# Patient Record
(demographics unavailable — no encounter records)

---

## 2024-11-12 NOTE — CONSULT LETTER
[Dear  ___] : Dear  [unfilled], [Courtesy Letter:] : I had the pleasure of seeing your patient, [unfilled], in my office today. [Please see my note below.] : Please see my note below. [Consult Closing:] : Thank you very much for allowing me to participate in the care of this patient.  If you have any questions, please do not hesitate to contact me. [Sincerely,] : Sincerely, [FreeTextEntry3] : Elizabeth Nugent MD  [employer] at [facility] Pulmonary, Critical Care, and Sleep Medicine

## 2024-11-12 NOTE — HISTORY OF PRESENT ILLNESS
[Never] : never [TextBox_4] : 74 yo F w/ hx of goiter, GERD, referred by Dr. Bales for insomnia as well as pulmonary consultation.  Sleep: She endorses insomnia since childhood. Mostly trouble falling asleep. In teens, typically went to bed late (1AM). 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 Currently goes to bed around 4AM, often takes her ~ 2 hours to fall asleep and sometimes more. Wakes up usually around noon. Estimates TST is 4-5 hrs nightly. No snoring, witnessed apneas, AM headaches, family hx of ANT. Occasional restless legs. Her ideal sleep schedule would be between 1-2AM-10AM.  Pulm: Had URI in May, since that time developed sporadic feeling of "puff of air" in her chest accompanied by cough. Does have GERD and takes prn Prilosec. No dyspnea, no CONDE, no wheezing. Father had lung CA, was a smoker. No prior hx of lung disease or known environmental exposures.  9/23/24: Feels like her symptoms have progressed, now she describes feeling like "when a plane hits an air pocket", difficult to describe the symptoms, happens 4 times/day, associated with cough, dry. She also has early am cough with yellow phlegm, half a teaspoon in volume. No chest pain. No limitations on physical activity, can walk multiple blocks, multiple flights of stairs. Takes naps occasionally. Feels sleepy by 7pm or so. Sleeps for 4-5 hours nightly. Says that for sleep study she was laying down for the whole night, and did not sleep at all.    11/11/2024 continues to have a "puff of air" in her mid chest; ppi x 18 days failed; no sob or exertional issues [ESS] : 1

## 2024-11-12 NOTE — ASSESSMENT
[FreeTextEntry1] : REVIEWED HST 8/2024: AHI 5.8 to 20.5 CT chest from 8/2024 reviewed: LLL atelectasis? RML atelectasis?; no other sig pathology  72 yo F w/ hx of goiter, GERD, referred by Dr. Bales for insomnia as well as persistent "puff of air" sensation.  Sleep: She endorses insomnia since childhood. Mostly trouble falling asleep. She has a delayed circadian rhythm; goes to bed around 4AM, sleep latency ~2 hrs, sleeps until noon. In teens, typically went to bed late (1AM). Acting late at night and caretaking for her mother reinforced delayed sleep pattern.HST showed mild-moderate ANT, with adequate sleep time 6.5h. Patient feels like she did not sleep at all, however HST shows phasic sleep and good quality. Possibly has paradoxical insomnia.  Plan:  -- The risks of untreated moderate obstructive sleep apnea were reviewed, including cardiovascular, neurological, metabolic disease among others. Options for treating sleep apnea discussed, including PAP therapy, CN12 stimulation and MAD. Also discussed treatment benefit for mild ANT is limited to symptomatic improvement, which she does have.  -- plan to obtain overnight PSG with delayed phase (4am till noon Fri-Sat) to definitively define that she sleeps ; will do this when she is ready  Pulm: Had URI in May, since that time developed sporadic feeling of "puff of air" in her chest accompanied by cough. Cough now resolved. No dyspnea, no CONDE, no wheezing. Father had lung CA, was a smoker. No prior hx of lung disease or known environmental exposures. Based on hx, do not suspect symptoms related to significant lung pathology. Reviewed CT chest, mild basilar atelectasis. Discussed that this is unrelated to heart or lung disease (had cardiac workup). Failed PPI trial. Dysfunctional breathing syndrome? Plan:  -- provided information regarding breathing exercises  RTC in 3 months, and/or when ready for PSG

## 2024-11-12 NOTE — PHYSICAL EXAM
[No Acute Distress] : no acute distress [Normal Appearance] : normal appearance [No Resp Distress] : no resp distress [No Acc Muscle Use] : no acc muscle use [Clear to Auscultation Bilaterally] : clear to auscultation bilaterally [Normal Gait] : normal gait [Oriented x3] : oriented x3 [Normal Affect] : normal affect

## 2024-12-04 NOTE — HISTORY OF PRESENT ILLNESS
[FreeTextEntry1] : 75 y/o female with who goiter, gerd, alin presents for f/up today  last seen  -24 hour bp monitor : avg 130/76 no cp, sob, orthopnea, pnd, edema, palpitations   seen by Dr. Nugent  sleep study - mild to mod alin  exercises w stretching walks 4 miles in a week diet healthy no mental health issues stress moderate sleep few hours due insomnia  PMH/PSH: diverticulosis goiter lasik gerd mild to mod alin  ALL: nkda  MEDS: none  SH: no tobacco social etoh no drugs retired actor/social work from [place]  no children  FH: mother - colon cancer,  105 father - lung cancer,  liver cancer 84 brother - alive, healthy 75 sister - alive, ppm, 76

## 2024-12-04 NOTE — DISCUSSION/SUMMARY
[Patient] : the patient [___ Month(s)] : in [unfilled] month(s) [FreeTextEntry1] : 75 y/o female with who goiter, gerd, alin presents for f/up today  -holter ordered for palpitations, pvc -24 hour bp monitor 5/24: avg 130/76 -CTA cor 3/24: Cardiac: 1. The calcium score is 0 Agatston units. 2. Distal LAD is suboptimally visualized, but appears non obstructive. 3. D1, D2 and OM1 are probably normal. 4. Remaining segments appear angiographically normal Non-cardiac: 1. No acute pathology. -Echo 3/24: 1. Left ventricular cavity is normal in size. Left ventricular wall thickness is normal. Left ventricular systolic function is normal with an ejection fraction of 73 % by Real's method of disks. There are no regional wall motion abnormalities seen. 2. Normal left ventricular diastolic function. 3. Normal right ventricular cavity size, with wall thickness, and normal systolic function. 4. No significant valvular disease. 5. No pericardial effusion seen. -ekg ordered today - nsr w pvc, normal intervals, no st/t changes -labs 2024 reviewed, labs ordered today -f/up w Dr. Nugent - mild to mod ALIN -counseled on cvd risk factors -f/up 2-3 months for cvd risk factors, bp  I have spent 30 minutes reviewing labs, records, tests and discussed cvd risk factors.    [EKG obtained to assist in diagnosis and management of assessed problem(s)] : EKG obtained to assist in diagnosis and management of assessed problem(s)

## 2025-02-06 NOTE — REVIEW OF SYSTEMS
[Fatigue] : no fatigue [Decreased Appetite] : appetite not decreased [Recent Weight Gain (___ Lbs)] : no recent weight gain [Recent Weight Loss (___ Lbs)] : no recent weight loss [Visual Field Defect] : no visual field defect [Dry Eyes] : no dryness [Dysphagia] : no dysphagia [Dysphonia] : no dysphonia [Chest Pain] : no chest pain [Palpitations] : no palpitations [Shortness Of Breath] : no shortness of breath [Nausea] : no nausea [Vomiting] : no vomiting [Polyuria] : no polyuria [Irregular Menses] : regular menses [Acanthosis] : no acanthosis  [Headaches] : no headaches [Tremors] : no tremors [Depression] : no depression [Polydipsia] : no polydipsia [Cold Intolerance] : no cold intolerance [Easy Bleeding] : no ~M tendency for easy bleeding [Easy Bruising] : no tendency for easy bruising

## 2025-02-06 NOTE — PHYSICAL EXAM
[Alert] : alert [Well Nourished] : well nourished [No Acute Distress] : no acute distress [Normal Sclera/Conjunctiva] : normal sclera/conjunctiva [PERRL] : pupils equal, round and reactive to light [Normal Outer Ear/Nose] : the ears and nose were normal in appearance [Normal TMs] : both tympanic membranes were normal [No Neck Mass] : no neck mass was observed [Thyroid Not Enlarged] : the thyroid was not enlarged [No Respiratory Distress] : no respiratory distress [Clear to Auscultation] : lungs were clear to auscultation bilaterally [Normal S1, S2] : normal S1 and S2 [Normal Rate] : heart rate was normal [Regular Rhythm] : with a regular rhythm [Normal Bowel Sounds] : normal bowel sounds [Soft] : abdomen soft [Normal Gait] : normal gait [No Joint Swelling] : no joint swelling seen [No Rash] : no rash [No Skin Lesions] : no skin lesions [Oriented x3] : oriented to person, place, and time [Normal Affect] : the affect was normal [Normal Insight/Judgement] : insight and judgment were intact

## 2025-02-06 NOTE — HISTORY OF PRESENT ILLNESS
[FreeTextEntry1] : 73 year old female with history of multiple nodules.   Last US was in 2022. As per patient there were multiple thyroid nodules, and largest is 1.5 cm. No previous FNAs were performed  No dysphagia, no hoarseness She did report anterior neck pressure   No FH of thyroid cancer, mother with history of hypothyroidism No exposure to head or neck radiation in the past  TFTs were wnl      	 ACC: [id number]     EXAM:  US HEAD NECK SOFT TISSUE   ORDERED BY: GOGO ROMO  PROCEDURE DATE:  08/30/2024    INTERPRETATION:  THYROID ULTRASOUND with Doppler dated 8/30/2024 3:51 PM  History: History of thyroid nodules;  Technique: Ultrasound evaluation of the thyroid was performed in the axial and sagittal projections. Color Doppler evaluation was also performed.  Prior study: CT chest dated 3/11/2024.  Findings:  RIGHT LOBE: Dimensions: 6.1 x 2.4 x 2.2 cm (sagittal x AP x transverse) Echotexture: Homogenous p.m. Vascularity: Normal. The right lobe contains 3 nodules.  Nodule 1: Location: Superior pole. Dimensions: 0.4 x 0.5 x 0.3 cm (sagittal x AP x transverse) Composition: Primarily cystic with a central solid component. For solid nodules or for the solid portion of a partially cystic nodule, does the solid portion have any of the following suspicious features? -  Yes: Hypoechoic -  No: Microcalcifications -  No: Irregular margin (infiltrative or microlobulated) -  Yes: Taller than wide (AP dimension > transverse) -  No: Extrathyroidal extension -  No: Interrupted rim calcification with soft tissue extrusion  Nodule 2: Location: Superior pole-medial. Dimensions: 0.6 x 0.5 x 0.6 cm (sagittal x AP x transverse) Composition: Partially cystic with eccentric solid areas For solid nodules or for the solid portion of a partially cystic nodule, does the solid portion have any of the following suspicious features? -  No: Hypoechoic -  No: Microcalcifications -  No: Irregular margin (infiltrative or microlobulated) -  No: Taller than wide (AP dimension > transverse) -  No: Extrathyroidal extension -  No: Interrupted rim calcification with soft tissue extrusion  Nodule 3: Location: Inferior pole. Dimensions: 0.4 x 0.5 x 0.5 cm (sagittal x AP x transverse) Composition: Spongiform. For solid nodules or for the solid portion of a partially cystic nodule, does the solid portion have any of the following suspicious features? -  No: Hypoechoic -  No: Microcalcifications -  No: Irregular margin (infiltrative or microlobulated) -  No: Taller than wide (AP dimension > transverse) -  No: Extrathyroidal extension -  No: Interrupted rim calcification with soft tissue extrusion  LEFT LOBE: Dimensions: 4.5 x 2.7 x 2.2 cm (sagittal x AP x transverse) Echotexture: Mildly heterogenous. Vascularity: Hypervascular The left lobe contains 3 nodules.  Nodule 1: Location: Superior pole. Dimensions: 0.6 x 0.4 x 0.7 cm (sagittal x AP x transverse) Composition: Predominantly solid For solid nodules or for the solid portion of a partially cystic nodule, does the solid portion have any of the following suspicious features? -  No: Hypoechoic -  No: Microcalcifications -  No: Irregular margin (infiltrative or microlobulated) -  No: Taller than wide (AP dimension > transverse) -  No: Extrathyroidal extension -  No: Interrupted rim calcification with soft tissue extrusion  Nodule 2: Location: Midpole-lateral. Dimensions: 0.9 x 0.4 x 0.5 cm cm (sagittal x AP x transverse) Composition: Predominantly solid For solid nodules or for the solid portion of a partially cystic nodule, does the solid portion have any of the following suspicious features? -  Yes: Hypoechoic -  Yes: Microcalcifications -  No: Irregular margin (infiltrative or microlobulated) -  No: Taller than wide (AP dimension > transverse) -  No: Extrathyroidal extension -  No: Interrupted rim calcification with soft tissue extrusion  Nodule 3: Location: Midpole-medial Dimensions: 1.4 x 1.0 x 1.0 cm (sagittal x AP x transverse) Composition: Predominantly cystic with some eccentric solid areas. For solid nodules or for the solid portion of a partially cystic nodule, does the solid portion have any of the following suspicious features? -  No: Hypoechoic -  No: Microcalcifications -  No: Irregular margin (infiltrative or microlobulated) -  No: Taller than wide (AP dimension > transverse) -  No: Extrathyroidal extension -  No: Interrupted rim calcification with soft tissue extrusion  ISTHMUS: Dimensions: 0.3 cm AP The isthmus lobe contains no nodules.  Cervical Lymph Node Evaluation: Right neck level 2 lymph node measuring 1.3 x 0.4 x 0.8 cm. Normal shape and morphology. Left neck level 2 node measuring 1.3 x 0.5 x 0.6 cm. Normal shape and morphology.  Parathyroid Examination: Parathyroid glands not visualized.   IMPRESSION: Multinodular thyroid. No nodules meet criteria for FNA at this time. Recommend follow-up thyroid ultrasound in 12 months.

## 2025-02-06 NOTE — ASSESSMENT
[FreeTextEntry1] : 74 year old female here for evaluation of multiple thyroid nodules.   Multiple thyroid nodules:  -No high risk history -Reported globus sensation and pressure in anterior neck  -Neck US today showing multiple ( numerous) sub-cm right and left sided mixed colloid nodules  -TFts wnl   Follow up in18 months for repeat US  Patient/Caregiver provided printed discharge information.

## 2025-05-12 NOTE — DATA REVIEWED
[de-identified] : After debridement, in light of the patients current symptoms, Complete audiometry was ordered and completed today. I have interpreted these results and reviewed them in detail with the patient.
